# Patient Record
Sex: MALE | Race: WHITE | URBAN - METROPOLITAN AREA
[De-identification: names, ages, dates, MRNs, and addresses within clinical notes are randomized per-mention and may not be internally consistent; named-entity substitution may affect disease eponyms.]

---

## 2018-02-21 ENCOUNTER — APPOINTMENT (OUTPATIENT)
Dept: ULTRASOUND IMAGING | Facility: CLINIC | Age: 23
End: 2018-02-21
Attending: EMERGENCY MEDICINE

## 2018-02-21 ENCOUNTER — HOSPITAL ENCOUNTER (EMERGENCY)
Facility: CLINIC | Age: 23
Discharge: HOME | End: 2018-02-22
Attending: EMERGENCY MEDICINE | Admitting: EMERGENCY MEDICINE

## 2018-02-21 ENCOUNTER — APPOINTMENT (OUTPATIENT)
Dept: CT IMAGING | Facility: CLINIC | Age: 23
End: 2018-02-21
Attending: EMERGENCY MEDICINE

## 2018-02-21 DIAGNOSIS — R10.32 LEFT GROIN PAIN: ICD-10-CM

## 2018-02-21 LAB
ALBUMIN SERPL-MCNC: 4.2 G/DL (ref 3.4–5)
ALBUMIN UR-MCNC: 10 MG/DL
ALP SERPL-CCNC: 71 U/L (ref 40–150)
ALT SERPL W P-5'-P-CCNC: 22 U/L (ref 0–70)
ANION GAP SERPL CALCULATED.3IONS-SCNC: 9 MMOL/L (ref 3–14)
APPEARANCE UR: CLEAR
AST SERPL W P-5'-P-CCNC: 10 U/L (ref 0–45)
BASOPHILS # BLD AUTO: 0 10E9/L (ref 0–0.2)
BASOPHILS NFR BLD AUTO: 0.2 %
BILIRUB SERPL-MCNC: 0.5 MG/DL (ref 0.2–1.3)
BILIRUB UR QL STRIP: NEGATIVE
BUN SERPL-MCNC: 14 MG/DL (ref 7–30)
CALCIUM SERPL-MCNC: 9.2 MG/DL (ref 8.5–10.1)
CHLORIDE SERPL-SCNC: 105 MMOL/L (ref 94–109)
CO2 SERPL-SCNC: 27 MMOL/L (ref 20–32)
COLOR UR AUTO: YELLOW
CREAT SERPL-MCNC: 1.11 MG/DL (ref 0.66–1.25)
DIFFERENTIAL METHOD BLD: NORMAL
EOSINOPHIL # BLD AUTO: 0.1 10E9/L (ref 0–0.7)
EOSINOPHIL NFR BLD AUTO: 1 %
ERYTHROCYTE [DISTWIDTH] IN BLOOD BY AUTOMATED COUNT: 12.4 % (ref 10–15)
GFR SERPL CREATININE-BSD FRML MDRD: 81 ML/MIN/1.7M2
GLUCOSE SERPL-MCNC: 104 MG/DL (ref 70–99)
GLUCOSE UR STRIP-MCNC: NEGATIVE MG/DL
HCT VFR BLD AUTO: 44.7 % (ref 40–53)
HGB BLD-MCNC: 15.4 G/DL (ref 13.3–17.7)
HGB UR QL STRIP: ABNORMAL
HYALINE CASTS #/AREA URNS LPF: 1 /LPF (ref 0–2)
IMM GRANULOCYTES # BLD: 0 10E9/L (ref 0–0.4)
IMM GRANULOCYTES NFR BLD: 0.2 %
KETONES UR STRIP-MCNC: NEGATIVE MG/DL
LEUKOCYTE ESTERASE UR QL STRIP: NEGATIVE
LIPASE SERPL-CCNC: 108 U/L (ref 73–393)
LYMPHOCYTES # BLD AUTO: 3.1 10E9/L (ref 0.8–5.3)
LYMPHOCYTES NFR BLD AUTO: 37.8 %
MCH RBC QN AUTO: 30.4 PG (ref 26.5–33)
MCHC RBC AUTO-ENTMCNC: 34.5 G/DL (ref 31.5–36.5)
MCV RBC AUTO: 88 FL (ref 78–100)
MONOCYTES # BLD AUTO: 0.7 10E9/L (ref 0–1.3)
MONOCYTES NFR BLD AUTO: 8.7 %
MUCOUS THREADS #/AREA URNS LPF: PRESENT /LPF
NEUTROPHILS # BLD AUTO: 4.2 10E9/L (ref 1.6–8.3)
NEUTROPHILS NFR BLD AUTO: 52.1 %
NITRATE UR QL: NEGATIVE
NRBC # BLD AUTO: 0 10*3/UL
NRBC BLD AUTO-RTO: 0 /100
PH UR STRIP: 6 PH (ref 5–7)
PLATELET # BLD AUTO: 228 10E9/L (ref 150–450)
POTASSIUM SERPL-SCNC: 3.5 MMOL/L (ref 3.4–5.3)
PROT SERPL-MCNC: 7.9 G/DL (ref 6.8–8.8)
RBC # BLD AUTO: 5.07 10E12/L (ref 4.4–5.9)
RBC #/AREA URNS AUTO: 2 /HPF (ref 0–2)
SODIUM SERPL-SCNC: 141 MMOL/L (ref 133–144)
SOURCE: ABNORMAL
SP GR UR STRIP: 1.02 (ref 1–1.03)
SQUAMOUS #/AREA URNS AUTO: <1 /HPF (ref 0–1)
UROBILINOGEN UR STRIP-MCNC: NORMAL MG/DL (ref 0–2)
WBC # BLD AUTO: 8.2 10E9/L (ref 4–11)
WBC #/AREA URNS AUTO: 1 /HPF (ref 0–2)

## 2018-02-21 PROCEDURE — 83690 ASSAY OF LIPASE: CPT | Performed by: EMERGENCY MEDICINE

## 2018-02-21 PROCEDURE — 99285 EMERGENCY DEPT VISIT HI MDM: CPT | Mod: 25 | Performed by: EMERGENCY MEDICINE

## 2018-02-21 PROCEDURE — 93976 VASCULAR STUDY: CPT

## 2018-02-21 PROCEDURE — 81001 URINALYSIS AUTO W/SCOPE: CPT | Performed by: EMERGENCY MEDICINE

## 2018-02-21 PROCEDURE — 80053 COMPREHEN METABOLIC PANEL: CPT | Performed by: EMERGENCY MEDICINE

## 2018-02-21 PROCEDURE — 85025 COMPLETE CBC W/AUTO DIFF WBC: CPT | Performed by: EMERGENCY MEDICINE

## 2018-02-21 PROCEDURE — 74176 CT ABD & PELVIS W/O CONTRAST: CPT

## 2018-02-21 PROCEDURE — 99284 EMERGENCY DEPT VISIT MOD MDM: CPT | Mod: Z6 | Performed by: EMERGENCY MEDICINE

## 2018-02-21 RX ORDER — ONDANSETRON 2 MG/ML
4 INJECTION INTRAMUSCULAR; INTRAVENOUS EVERY 30 MIN PRN
Status: DISCONTINUED | OUTPATIENT
Start: 2018-02-21 | End: 2018-02-22 | Stop reason: HOSPADM

## 2018-02-21 RX ORDER — MORPHINE SULFATE 4 MG/ML
4 INJECTION, SOLUTION INTRAMUSCULAR; INTRAVENOUS
Status: DISCONTINUED | OUTPATIENT
Start: 2018-02-21 | End: 2018-02-22 | Stop reason: HOSPADM

## 2018-02-21 RX ORDER — MULTIPLE VITAMINS W/ MINERALS TAB 9MG-400MCG
1 TAB ORAL DAILY
COMMUNITY

## 2018-02-21 ASSESSMENT — ENCOUNTER SYMPTOMS
ADENOPATHY: 0
NECK STIFFNESS: 0
FEVER: 0
SHORTNESS OF BREATH: 0
CHILLS: 0
POLYDIPSIA: 0
NAUSEA: 1
COLOR CHANGE: 0
NECK PAIN: 0
AGITATION: 0
BRUISES/BLEEDS EASILY: 0
LIGHT-HEADEDNESS: 0
VOMITING: 0
DIFFICULTY URINATING: 0
ABDOMINAL PAIN: 0
DYSURIA: 0

## 2018-02-21 NOTE — ED AVS SNAPSHOT
[facility], [place], Emergency Department    [address] [place] [id number]    Phone:  [phone]                                       Harris Osman   MRN: [id number]    Department:  [facility], Emergency Department   Date of Visit:  2/21/2018           After Visit Summary Signature Page     I have received my discharge instructions, and my questions have been answered. I have discussed any challenges I see with this plan with the nurse or doctor.    ..........................................................................................................................................  Patient/Patient Representative Signature      ..........................................................................................................................................  Patient Representative Print Name and Relationship to Patient    ..................................................               ................................................  Date                                            Time    ..........................................................................................................................................  Reviewed by Signature/Title    ...................................................              ..............................................  Date                                                            Time

## 2018-02-21 NOTE — ED AVS SNAPSHOT
[facility], Emergency Department    [address] [address]    Phone:  [phone]                                       Harris Osman   MRN: [id number]    Department:  [facility], Emergency Department   Date of Visit:  2/21/2018           Patient Information     Date Of Birth          1995        Your diagnoses for this visit were:     Left groin pain        You were seen by Mark Majano MD.        Discharge Instructions       Please make an appointment to follow up with [facility] (phone: [phone] in 1-2 days if you have any concerns.  If your symptoms worsen please come back to the emergency department.  Please take Tylenol or ibuprofen for pain, if needed.        *ABDOMINAL PAIN,UNCERTAIN CAUSE [Male]  Based on your visit today, the exact cause of your abdominal (stomach) pain is not certain. Your condition does not seem serious now; however, the signs of a serious problem may take more time to appear. Therefore, it is important for you to watch for any new symptoms or worsening of your condition.  HOME CARE:  1) Rest until your next exam. No strenuous activities.  2) Eat a diet low in fiber (called a low-residue diet). Foods allowed include refined breads, white rice, fruit and vegetable juices without pulp, tender meats. These foods will pass more easily through the intestine.  3) Avoid fried or fatty foods, dairy, alcohol and spicy foods until your symptoms go away.  FOLLOW UP with your doctor or this facility as instructed, or if your pain does not begin to improve in the next 24 hours.   GET PROMPT MEDICAL ATTENTION if any of the following occur:  -- Pain gets worse or moves to the right lower abdomen  -- New or worsening vomiting or diarrhea  -- Swelling of the abdomen  -- Unable to pass stool for more than three days  -- New fever over 101.0  F (38.3  C), or rising fever  -- Blood in vomit or bowel movements (dark red or black color)  -- Jaundice (yellow color  of eyes and skin)  -- Weakness, dizziness or fainting  -- Chest, arm, back, neck or jaw pain    2094-7984 [employer], [address] [place] [address], [place] [address]. All rights reserved. This information is not intended as a substitute for professional medical care. Always follow your healthcare professional's instructions.            Discharge References/Attachments     TESTICULAR PAIN, UNCLEAR CAUSE (ENGLISH)      24 Hour Appointment Hotline       To make an appointment at any [facility], call [phone] ([phone]). If you don't have a family doctor or clinic, we will help you find one. [facility] clinics are conveniently located to serve the needs of you and your family.             Review of your medicines      Our records show that you are taking the medicines listed below. If these are incorrect, please call your family doctor or clinic.        Dose / Directions Last dose taken    Multi-vitamin Tabs tablet   Dose:  1 tablet        Take 1 tablet by mouth daily   Refills:  0        PRILOSEC OTC PO        Take by mouth as needed   Refills:  0                Procedures and tests performed during your visit     CBC with platelets differential    CT Abdomen Pelvis w/o Contrast    Comprehensive metabolic panel    Lipase    Peripheral IV catheter    UA with Microscopic    US Testicular & Scrotum w [employer]      Orders Needing Specimen Collection     None      Pending Results     Date and Time Order Name Status Description    2/21/2018 2338 CT Abdomen Pelvis w/o Contrast Preliminary     2/21/2018 2150 US Testicular & Scrotum w [employer] Preliminary             Pending Culture Results     No orders found for last 3 day(s).            Pending Results Instructions     If you had any lab results that were not finalized at the time of your Discharge, you can call the ED Lab Result RN at [phone]. You will be contacted by this team for any positive Lab results or changes in treatment. The nurses  "are available 7 days a week from 10A to 6:30P.  You can leave a message 24 hours per day and they will return your call.        Thank you for choosing [facility]       Thank you for choosing [facility] for your care. Our goal is always to provide you with excellent care. Hearing back from our patients is one way we can continue to improve our services. Please take a few minutes to complete the written survey that you may receive in the mail after you visit with us. Thank you!        [facility] Information     [employer] lets you send messages to your doctor, view your test results, renew your prescriptions, schedule appointments and more. To sign up, go to [email or web address] . Click on \"Log in\" on the left side of the screen, which will take you to the Welcome page. Then click on \"Sign up Now\" on the right side of the page.     You will be asked to enter the access code listed below, as well as some personal information. Please follow the directions to create your username and password.     Your access code is: [email or web address]  Expires: 2018  1:13 AM     Your access code will  in 90 days. If you need help or a new code, please call your [facility] clinic or [phone].        Care EveryWhere ID     This is your Care EveryWhere ID. This could be used by other organizations to access your [facility] medical records  [id number]        Equal Access to Services     NE SHEETS : Hadjaison Segundo, waaxda lufab, qaybta kaalronnie lerma. So [facility] [phone].    ATENCIÓN: Si habla español, tiene a daigle disposición servicios gratuitos de asistencia lingüística. Marco al [phone].    We comply with applicable federal civil rights laws and [place] laws. We do not discriminate on the basis of race, color, national origin, age, disability, sex, sexual orientation, or gender identity.            After Visit Summary       This is your record. Keep this with you " and show to your community pharmacist(s) and doctor(s) at your next visit.

## 2018-02-22 VITALS
HEIGHT: 71 IN | RESPIRATION RATE: 15 BRPM | HEART RATE: 87 BPM | DIASTOLIC BLOOD PRESSURE: 60 MMHG | TEMPERATURE: 98 F | WEIGHT: 82.2 LBS | BODY MASS INDEX: 11.51 KG/M2 | SYSTOLIC BLOOD PRESSURE: 113 MMHG | OXYGEN SATURATION: 99 %

## 2018-02-22 NOTE — DISCHARGE INSTRUCTIONS
Please make an appointment to follow up with [facility] (phone: [phone] in 1-2 days if you have any concerns.  If your symptoms worsen please come back to the emergency department.  Please take Tylenol or ibuprofen for pain, if needed.        *ABDOMINAL PAIN,UNCERTAIN CAUSE [Male]  Based on your visit today, the exact cause of your abdominal (stomach) pain is not certain. Your condition does not seem serious now; however, the signs of a serious problem may take more time to appear. Therefore, it is important for you to watch for any new symptoms or worsening of your condition.  HOME CARE:  1) Rest until your next exam. No strenuous activities.  2) Eat a diet low in fiber (called a low-residue diet). Foods allowed include refined breads, white rice, fruit and vegetable juices without pulp, tender meats. These foods will pass more easily through the intestine.  3) Avoid fried or fatty foods, dairy, alcohol and spicy foods until your symptoms go away.  FOLLOW UP with your doctor or this facility as instructed, or if your pain does not begin to improve in the next 24 hours.   GET PROMPT MEDICAL ATTENTION if any of the following occur:  -- Pain gets worse or moves to the right lower abdomen  -- New or worsening vomiting or diarrhea  -- Swelling of the abdomen  -- Unable to pass stool for more than three days  -- New fever over 101.0  F (38.3  C), or rising fever  -- Blood in vomit or bowel movements (dark red or black color)  -- Jaundice (yellow color of eyes and skin)  -- Weakness, dizziness or fainting  -- Chest, arm, back, neck or jaw pain    9803-6228 [employer], [address] [place] [address], [place] [address]. All rights reserved. This information is not intended as a substitute for professional medical care. Always follow your healthcare professional's instructions.

## 2018-02-22 NOTE — ED PROVIDER NOTES
"    [place] EMERGENCY DEPARTMENT ([facility])  2/21/18 ED 7    History     Chief Complaint   Patient presents with     Abdominal Pain     LLQ     Groin Pain     The history is provided by the patient.     Harris Osman is a 22 year old male presenting with left inguinal and left testicular pain for several hours.  Pain is throbbing, constant, mild to moderate, nonradiating, nothing makes it better or worse.  He denies any hematuria, difficulty urinating, dysuria, penile discharge, swelling of the scrotum or testicles.  He denies any abdominal pain.  He does have some nausea, but no vomiting.  No fevers.  No previous abdominal surgeries.  He is healthy and has no previous medical history.      I have reviewed the Medications, Allergies, Past Medical and Surgical History, and Social History in the Epic system.    Review of Systems   Constitutional: Negative for chills and fever.   HENT: Negative for congestion.    Eyes: Negative for visual disturbance.   Respiratory: Negative for shortness of breath.    Cardiovascular: Negative for chest pain.   Gastrointestinal: Positive for nausea. Negative for abdominal pain and vomiting.   Endocrine: Negative for polydipsia and polyuria.   Genitourinary: Positive for testicular pain. Negative for decreased urine volume, difficulty urinating, discharge, dysuria, penile pain, penile swelling and urgency.   Musculoskeletal: Negative for neck pain and neck stiffness.   Skin: Negative for color change.   Allergic/Immunologic: Negative for immunocompromised state.   Neurological: Negative for light-headedness.   Hematological: Negative for adenopathy. Does not bruise/bleed easily.   Psychiatric/Behavioral: Negative for agitation and behavioral problems.       Physical Exam   BP: (!) 172/91  Pulse: 91  Temp: 97.6  F (36.4  C)  Resp: 16  Height: 180.3 cm (5' 11\")  Weight: 37.3 kg (82 lb 3.2 oz)  SpO2: 99 %      Physical Exam   Constitutional: He is oriented to person, place, and " time. He appears well-developed and well-nourished. No distress.   HENT:   Head: Normocephalic and atraumatic.   Mouth/Throat: Oropharynx is clear and moist. No oropharyngeal exudate.   Eyes: Conjunctivae and EOM are normal. Pupils are equal, round, and reactive to light. No scleral icterus.   Neck: Normal range of motion. Neck supple.   Cardiovascular: Normal rate, normal heart sounds and intact distal pulses.    Pulmonary/Chest: Effort normal and breath sounds normal. No respiratory distress. He has no wheezes. He has no rales.   Abdominal: Soft. Normal appearance and bowel sounds are normal. He exhibits no distension. There is no tenderness. There is no rebound, no guarding, no CVA tenderness, no tenderness at McBurney's point and negative Gee's sign. No hernia. Hernia confirmed negative in the right inguinal area and confirmed negative in the left inguinal area.   Genitourinary: Penis normal. Cremasteric reflex is present. Right testis shows no mass, no swelling and no tenderness. Cremasteric reflex is not absent on the right side. Left testis shows tenderness ( mild, over left epidydimus). Left testis shows no mass and no swelling. Left testis is descended. Cremasteric reflex is not absent on the left side. No penile tenderness. No discharge found.   Musculoskeletal: Normal range of motion. He exhibits no edema or tenderness.   Lymphadenopathy:        Right: No inguinal adenopathy present.        Left: No inguinal adenopathy present.   Neurological: He is alert and oriented to person, place, and time. No cranial nerve deficit. He exhibits normal muscle tone. Coordination normal.   Skin: Skin is warm. No rash noted. He is not diaphoretic.   Psychiatric: He has a normal mood and affect. His behavior is normal. Judgment and thought content normal.   Nursing note and vitals reviewed.      ED Course     ED Course     Procedures             Critical Care time:  none             Labs Ordered and Resulted from Time  of ED Arrival Up to the Time of Departure from the ED   COMPREHENSIVE METABOLIC PANEL - Abnormal; Notable for the following:        Result Value    Glucose 104 (*)     All other components within normal limits   ROUTINE UA WITH MICROSCOPIC - Abnormal; Notable for the following:     Blood Urine Trace (*)     Protein Albumin Urine 10 (*)     Mucous Urine Present (*)     All other components within normal limits   CBC WITH PLATELETS DIFFERENTIAL   LIPASE   PERIPHERAL IV CATHETER            Assessments & Plan (with Medical Decision Making)   Harris Osman is a 22-year-old man presenting with left groin pain.  Differential diagnosis: testicular torsion, epididymitis, unlikely hernia, unlikely acute diverticulitis or small bowel obstruction.    After thorough history and physical exam the patient appears to be in no acute distress.   He has mild tenderness over the epididymis on the left side during the exam and then he became acutely nauseous during the exam and had an episode of nonbloody, nonbilious emesis.  IV was placed and he was treated with intravenous Zofran and intravenous morphine.  Laboratory studies were obtained along with ultrasound scrotum/testicles.      Patient's laboratory studies returned with no leukocytosis, WBC is normal at 8200.  There is no anemia, hemoglobin is normal at 15.4.  Electrolytes show no evidence of dehydration, creatinine is normal at 1.11.  LFTs and lipase are normal.  Urinalysis shows no evidence of infection, however, there is trace blood in the urine.  I reviewed the patient's testicular ultrasound and I read the Radiology report; there is no evidence of testicular torsion, epididymitis, or orchitis.  I did order CT abdomen/pelvis for further evaluation.  Patient agrees with the plan.    I reviewed patient's CT abdomen/pelvis and I read the radiology report; there is no evidence of kidney stone, bowel obstruction, hydronephrosis, hernia, or any other etiology to explain his pain.   On re-examination of his abdomen/pelvis there is no tenderness whatsoever and there is no evidence of surgical abdomen.  On reexamination his testicles there is no swelling or tenderness either.  I do not have clear etiology of patient's pain and symptoms, however, I informed him to return to the emergency department if his symptoms return.  At this point is stable for discharge with outpatient follow-up.  He agrees with this plan.    This part of the document was transcribed by Earline Amaro Medical Scribe.      I have reviewed the nursing notes.    I have reviewed the findings, diagnosis, plan and need for follow up with the patient.    New Prescriptions    No medications on file       Final diagnoses:   Left groin pain       2/21/2018   [facility], [place], EMERGENCY DEPARTMENT     Mark Majano MD  02/22/18 0108

## 2018-02-22 NOTE — ED NOTES
Patient presents with c/o abdominal and groin pain. Patient states he developed LLQ pain on Sunday night, has now progressed to left groin/testicular pain. Reports minor swelling on left testicle and increased pain with movements.